# Patient Record
Sex: FEMALE | Race: BLACK OR AFRICAN AMERICAN | Employment: FULL TIME | URBAN - METROPOLITAN AREA
[De-identification: names, ages, dates, MRNs, and addresses within clinical notes are randomized per-mention and may not be internally consistent; named-entity substitution may affect disease eponyms.]

---

## 2017-03-09 ENCOUNTER — OFFICE VISIT (OUTPATIENT)
Dept: OBGYN CLINIC | Age: 32
End: 2017-03-09

## 2017-03-09 VITALS
DIASTOLIC BLOOD PRESSURE: 84 MMHG | HEIGHT: 66 IN | WEIGHT: 141 LBS | SYSTOLIC BLOOD PRESSURE: 120 MMHG | BODY MASS INDEX: 22.66 KG/M2

## 2017-03-09 DIAGNOSIS — N93.9 ABNORMAL UTERINE BLEEDING (AUB): Primary | ICD-10-CM

## 2017-03-09 LAB
HCG URINE, QL. (POC): NEGATIVE
VALID INTERNAL CONTROL?: YES

## 2017-03-09 RX ORDER — MIRTAZAPINE 15 MG/1
30 TABLET, FILM COATED ORAL
COMMUNITY

## 2017-03-09 NOTE — MR AVS SNAPSHOT
Visit Information Date & Time Provider Department Dept. Phone Encounter #  
 3/9/2017  2:00 PM Suzanne Turk MD [facility] [phone] [id number] Upcoming Health Maintenance Date Due  
 PAP AKA CERVICAL CYTOLOGY 3/21/2007 INFLUENZA AGE 9 TO ADULT 8/1/2016 Allergies as of 3/9/2017  Review Complete On: 3/9/2017 By: Alexis Hays No Known Allergies Current Immunizations  Never Reviewed No immunizations on file. Not reviewed this visit You Were Diagnosed With   
  
 Codes Comments Encounter for gynecological examination without abnormal finding    -  Primary ICD-10-CM: T32.745 ICD-9-CM: V72.31 Abnormal uterine bleeding (AUB)     ICD-10-CM: N93.9 ICD-9-CM: 626.9 Vitals BP Height(growth percentile) Weight(growth percentile) BMI OB Status Smoking Status 120/84 5' 5.5\" (1.664 m) 141 lb (64 kg) 23.11 kg/m2 Having regular periods Never Smoker BMI and BSA Data Body Mass Index Body Surface Area  
 23.11 kg/m 2 1.72 m 2 Your Updated Medication List  
  
   
This list is accurate as of: 3/9/17  2:37 PM.  Always use your most recent med list.  
  
  
  
  
 REMERON 15 mg tablet Generic drug:  mirtazapine Take  by mouth nightly. We Performed the Following AMB POC URINE PREGNANCY TEST, VISUAL COLOR COMPARISON [67172 CPT(R)] CBC W/O DIFF [72558 CPT(R)] TSH AND FREE T4 [80607 CPT(R)] Patient Instructions Shot for [employer] Control: Care Instructions Your Care Instructions The shot is used to prevent pregnancy. You get the shot in your upper arm or rear end (buttocks). The shot gives you a dose of the hormone progestin. The shot is often called by its brand name, Depo-Provera. The shot provides birth control for 3 months at a time. You then need another shot. Follow-up care is a key part of your treatment and safety.  Be sure to make and go to all appointments, and call your doctor if you are having problems. It's also a good idea to know your test results and keep a list of the medicines you take. How can you care for yourself at home? How do you use the birth control shot? · If you get the shot during the first 5 days of your normal period, use backup birth control, such as a condom, or don't have intercourse for 24 hours. · If you get the shot more than 5 days after your periods starts, use backup birth control or don't have intercourse for 5 days. · Talk to your doctor about a schedule to get the shot. You need the shot every 3 months. If you are late getting it, you'll need backup birth control. What if you miss or are late for a shot? Always read the label for specific instructions, or call your doctor. Here are some basic guidelines: · Use backup birth control, such as a condom, or don't have intercourse. Continue using one of these methods until 5 days after you get the missed or late shot. · If you had intercourse, you can use emergency contraception, such as the morning-after pill (Plan B). You can use emergency contraception for up to 5 days after having had sex, but it works best if you take it right away. What else do you need to know? · The shot has side effects. Because the shot protects for 3 months, the side effects may last 3 months. ¨ You may have changes in your period and your period may stop. You may also have spotting or bleeding between periods. ¨ You may have mood changes, less interest in sex, or weight gain. · The shot may cause bone loss. Talk to your doctor about this and take steps to prevent bone loss, such as getting enough calcium in your diet and exercising regularly. · Check with your doctor before you use any other medicines, including over-the-counter medicines, vitamins, herbal products, and supplements.  Birth control hormones may not work as well to prevent pregnancy when combined with other medicines. · The shot doesn't protect against sexually transmitted infections (STIs), such as herpes or HIV/AIDS. If you're not sure whether your sex partner might have an STI, use a condom to protect against infection. When should you call for help? Call your doctor now or seek immediate medical care if: 
· You have severe belly pain. Watch closely for changes in your health, and be sure to contact your doctor if: 
· You think you may be pregnant. · You have any problems with your birth control. · You feel you may be depressed. · You regularly have spotting. · You think you may have been exposed to or have a sexually transmitted infection. Where can you learn more? Go to http://ciaran-vish.info/. Enter G435 in the search box to learn more about \"Shot for Birth Control: Care Instructions. \" Current as of: May 30, 2016 Content Version: 11.1 © 9035-4251 [employer]. Care instructions adapted under license by [employer] (which disclaims liability or warranty for this information). If you have questions about a medical condition or this instruction, always ask your healthcare professional. [place] [address] any warranty or liability for your use of this information. Introducing [facility]! [facility] introduces ShoutWire patient portal. Now you can access parts of your medical record, email your doctor's office, and request medication refills online. 1. In your internet browser, go to [email or web address] 2. Click on the First Time User? Click Here link in the Sign In box. You will see the New Member Sign Up page. 3. Enter your ShoutWire Access Code exactly as it appears below. You will not need to use this code after youve completed the sign-up process. If you do not sign up before the expiration date, you must request a new code. · ShoutWire Access Code: [email or web address] Expires: 6/7/2017  2:27 PM 
 
4. Enter the last four digits of your Social Security Number (xxxx) and Date of Birth (mm/dd/yyyy) as indicated and click Submit. You will be taken to the next sign-up page. 5. Create a Your Style Unzipped ID. This will be your Your Style Unzipped login ID and cannot be changed, so think of one that is secure and easy to remember. 6. Create a Your Style Unzipped password. You can change your password at any time. 7. Enter your Password Reset Question and Answer. This can be used at a later time if you forget your password. 8. Enter your e-mail address. You will receive e-mail notification when new information is available in [address] 9. Click Sign Up. You can now view and download portions of your medical record. 10. Click the Download Summary menu link to download a portable copy of your medical information. If you have questions, please visit the Frequently Asked Questions section of the Your Style Unzipped website. Remember, Your Style Unzipped is NOT to be used for urgent needs. For medical emergencies, dial 911. Now available from your iPhone and Android! Please provide this summary of care documentation to your next provider. If you have any questions after today's visit, please call [phone].

## 2017-03-09 NOTE — PROGRESS NOTES
Abnormal bleeding note      Brendan Adkins is a [address] [place] y.o. female who complains of vaginal bleeding problems. Her current method of family planning is Depo-Provera injections. She developed this problem approximately 2 months ago. She has had vaginal bleeding which she describes as light, moderate lasting up to several weeks. Pad or tampon count: changes every 6 hours. Associated symptoms include recent depo injection    Alleviating factors: none    Aggravating factors: none     Her relevant past medical history:   Past Medical History:   Diagnosis Date    Depression         History reviewed. No pertinent surgical history. Social History     Occupational History    Not on file. Social History Main Topics    Smoking status: Never Smoker    Smokeless tobacco: Never Used    Alcohol use No    Drug use: No    Sexual activity: Not Currently     Partners: Male     Birth control/ protection: Injection     Family History   Problem Relation Age of Onset    Diabetes Mother     Hypertension Mother     Heart Disease Mother        No Known Allergies  Prior to Admission medications    Medication Sig Start Date End Date Taking? Authorizing Provider   mirtazapine (REMERON) 15 mg tablet Take  by mouth nightly.    Yes Historical Provider        Review of Systems - History obtained from the patient  Constitutional: negative for weight loss, fever, night sweats  HEENT: negative for hearing loss, earache, congestion, snoring, sorethroat  CV: negative for chest pain, palpitations, edema  Resp: negative for cough, shortness of breath, wheezing  Breast: negative for breast lumps, nipple discharge, galactorrhea  GI: negative for change in bowel habits, abdominal pain, black or bloody stools  : negative for frequency, dysuria, hematuria  MSK: negative for back pain, joint pain, muscle pain  Skin: negative for itching, rash, hives  Neuro: negative for dizziness, headache, confusion, weakness  Psych: negative for anxiety, depression, change in mood  Heme/lymph: negative for bleeding, bruising, pallor      Objective:    Visit Vitals    /84    Ht 5' 5.5\" (1.664 m)    Wt 141 lb (64 kg)    BMI 23.11 kg/m2          PHYSICAL EXAMINATION    Constitutional  · Appearance: well-nourished, well developed, alert, in no acute distress    HENT  · Head and Face: appears normal    Neck  · Inspection/Palpation: normal appearance, no masses or tenderness  · Lymph Nodes: no lymphadenopathy present  · Thyroid: gland size normal, nontender, no nodules or masses present on palpation    Gastrointestinal  · Abdominal Examination: abdomen non-tender to palpation, normal bowel sounds, no masses present  · Liver and spleen: no hepatomegaly present, spleen not palpable  · Hernias: no hernias identified    Genitourinary  · External Genitalia: normal appearance for age, no discharge present, no tenderness present, no inflammatory lesions present, no masses present, no atrophy present  · Vagina: normal vaginal vault without central or paravaginal defects, no discharge present, no inflammatory lesions present, no masses present  · Bladder: non-tender to palpation  · Urethra: appears normal  · Cervix: normal   · Uterus: normal size, shape and consistency  · Adnexa: no adnexal tenderness present, no adnexal masses present  · Perineum: perineum within normal limits, no evidence of trauma, no rashes or skin lesions present  · Anus: anus within normal limits, no hemorrhoids present  · Inguinal Lymph Nodes: no lymphadenopathy present    Skin  · General Inspection: no rash, no lesions identified    Neurologic/Psychiatric  · Mental Status:  · Orientation: grossly oriented to person, place and time  · Mood and Affect: mood normal, affect appropriate    Assessment/Plan:   AUB likely from depo, will start ocps and check tsh/cbc. Pt has ocps pack at home and will restart that pill and will call to notify what the name of the pill is.     Instructions given to pt. Handouts given to pt.

## 2017-03-09 NOTE — PATIENT INSTRUCTIONS
Shot for [employer] Control: Care Instructions  Your Care Instructions  The shot is used to prevent pregnancy. You get the shot in your upper arm or rear end (buttocks). The shot gives you a dose of the hormone progestin. The shot is often called by its brand name, Depo-Provera. The shot provides birth control for 3 months at a time. You then need another shot. Follow-up care is a key part of your treatment and safety. Be sure to make and go to all appointments, and call your doctor if you are having problems. It's also a good idea to know your test results and keep a list of the medicines you take. How can you care for yourself at home? How do you use the birth control shot? · If you get the shot during the first 5 days of your normal period, use backup birth control, such as a condom, or don't have intercourse for 24 hours. · If you get the shot more than 5 days after your periods starts, use backup birth control or don't have intercourse for 5 days. · Talk to your doctor about a schedule to get the shot. You need the shot every 3 months. If you are late getting it, you'll need backup birth control. What if you miss or are late for a shot? Always read the label for specific instructions, or call your doctor. Here are some basic guidelines:  · Use backup birth control, such as a condom, or don't have intercourse. Continue using one of these methods until 5 days after you get the missed or late shot. · If you had intercourse, you can use emergency contraception, such as the morning-after pill (Plan B). You can use emergency contraception for up to 5 days after having had sex, but it works best if you take it right away. What else do you need to know? · The shot has side effects. Because the shot protects for 3 months, the side effects may last 3 months. ¨ You may have changes in your period and your period may stop. You may also have spotting or bleeding between periods.   ¨ You may have mood changes, less interest in sex, or weight gain. · The shot may cause bone loss. Talk to your doctor about this and take steps to prevent bone loss, such as getting enough calcium in your diet and exercising regularly. · Check with your doctor before you use any other medicines, including over-the-counter medicines, vitamins, herbal products, and supplements. Birth control hormones may not work as well to prevent pregnancy when combined with other medicines. · The shot doesn't protect against sexually transmitted infections (STIs), such as herpes or HIV/AIDS. If you're not sure whether your sex partner might have an STI, use a condom to protect against infection. When should you call for help? Call your doctor now or seek immediate medical care if:  · You have severe belly pain. Watch closely for changes in your health, and be sure to contact your doctor if:  · You think you may be pregnant. · You have any problems with your birth control. · You feel you may be depressed. · You regularly have spotting. · You think you may have been exposed to or have a sexually transmitted infection. Where can you learn more? Go to http://ciaran-vish.info/. Enter J371 in the search box to learn more about \"Shot for Birth Control: Care Instructions. \"  Current as of: May 30, 2016  Content Version: 11.1  © 7795-7243 [employer]. Care instructions adapted under license by [employer] (which disclaims liability or warranty for this information). If you have questions about a medical condition or this instruction, always ask your healthcare professional. [place] [address] any warranty or liability for your use of this information.

## 2017-03-10 LAB
ERYTHROCYTE [DISTWIDTH] IN BLOOD BY AUTOMATED COUNT: 14.3 % (ref 12.3–15.4)
HCT VFR BLD AUTO: 33.8 % (ref 34–46.6)
HGB BLD-MCNC: 11.4 G/DL (ref 11.1–15.9)
MCH RBC QN AUTO: 29.5 PG (ref 26.6–33)
MCHC RBC AUTO-ENTMCNC: 33.7 G/DL (ref 31.5–35.7)
MCV RBC AUTO: 87 FL (ref 79–97)
PLATELET # BLD AUTO: 237 X10E3/UL (ref 150–379)
RBC # BLD AUTO: 3.87 X10E6/UL (ref 3.77–5.28)
T4 FREE SERPL-MCNC: 1.08 NG/DL (ref 0.82–1.77)
TSH SERPL DL<=0.005 MIU/L-ACNC: 0.84 UIU/ML (ref 0.45–4.5)
WBC # BLD AUTO: 4.7 X10E3/UL (ref 3.4–10.8)

## 2018-10-04 ENCOUNTER — OFFICE VISIT (OUTPATIENT)
Dept: OBGYN CLINIC | Age: 33
End: 2018-10-04

## 2018-10-04 VITALS
HEIGHT: 66 IN | SYSTOLIC BLOOD PRESSURE: 126 MMHG | WEIGHT: 156 LBS | HEART RATE: 105 BPM | DIASTOLIC BLOOD PRESSURE: 80 MMHG | BODY MASS INDEX: 25.07 KG/M2

## 2018-10-04 DIAGNOSIS — Z01.419 WELL WOMAN EXAM WITH ROUTINE GYNECOLOGICAL EXAM: Primary | ICD-10-CM

## 2018-10-04 DIAGNOSIS — N89.8 VAGINAL DISCHARGE: ICD-10-CM

## 2018-10-04 DIAGNOSIS — Z11.3 SCREEN FOR STD (SEXUALLY TRANSMITTED DISEASE): ICD-10-CM

## 2018-10-04 RX ORDER — QUETIAPINE FUMARATE 50 MG/1
50 TABLET, FILM COATED ORAL
COMMUNITY

## 2018-10-04 NOTE — PROGRESS NOTES
Annual exam ages [age] Dhavalcooper Mathews is a No obstetric history on file. ,  29 y.o. female [address] No LMP recorded (lmp unknown). Patient has had an injection. , who presents for her annual checkup. LV=3/2017 with Dr. Cecille Chen. 
 
 
- has been dx'd with anxiety and depression, does not usually take all of her prescribed meds 
- on Remiron and Seroquel \"for sleep\". Followed by psychiatrist. 
 
Penny Villanueva: 
She is having clear discharge with slight odor. Requests STD screen with swab. Partner has been unfaithful, was dx'd with STD, pt not sure what. States had blood work done through PCP office. With regard to the Gardasil vaccine, she is older than the FDA approved age to receive it. Menstrual status: 
 
Her periods are spotting in flow. She is using three to five pads or tampons per day, Was on DMPA. Had problems with AUB. Was due for last injection in August.  Last IC a couple of weeks ago. She denies dysmenorrhea. She denies premenstrual symptoms. Contraception: The current method of family planning is none. Sexual history: She reports using the following method of birth control/protection: Condoms. Last IC a few wks ago. Pap and Mammogram History: 
 
Her most recent Pap smear was normal, HPV was unknown, done at the beginning of this year? She does not have a history of abnormal paps. The patient has never had a mammogram. 
 
Breast Cancer History/Substance Abuse: 
 
Past Medical History:  
Diagnosis Date  Anxiety  Depression History reviewed. No pertinent surgical history. OB History  Para Term  AB Living 1 1  1  1 SAB TAB Ectopic Molar Multiple Live Births 1 # Outcome Date GA Lbr Berry/2nd Weight Sex Delivery Anes PTL Lv  
1  12/10/99 32w0d   M Vag-Spont  Y MACKENZIE Current Outpatient Prescriptions Medication Sig Dispense Refill  mirtazapine (REMERON) 15 mg tablet Take  by mouth nightly. Allergies: Review of patient's allergies indicates no known allergies. Social History Social History  Marital status:  Spouse name: N/A  
 Number of children: N/A  
 Years of education: N/A Occupational History  Not on file. Social History Main Topics  Smoking status: Never Smoker  Smokeless tobacco: Never Used  Alcohol use No  
 Drug use: No  
 Sexual activity: Not Currently Partners: Male Birth control/ protection: None Other Topics Concern  Not on file Social History Narrative ** Merged History Encounter ** Tobacco History:  reports that she has never smoked. She has never used smokeless tobacco. 
Alcohol Abuse:  reports that she does not drink alcohol. Drug Abuse:  reports that she does not use illicit drugs. There is no problem list on file for this patient. Family History Problem Relation Age of Onset  Diabetes Mother  Hypertension Mother  Heart Disease Mother  Prostate Cancer Father Review of Systems - History obtained from the patient Constitutional: negative for weight loss, fever, night sweats HEENT: negative for hearing loss, earache, congestion, snoring, sorethroat CV: negative for chest pain, palpitations, edema Resp: negative for cough, shortness of breath, wheezing GI: negative for change in bowel habits, abdominal pain, black or bloody stools : negative for frequency, dysuria, hematuria, vaginal discharge MSK: negative for back pain, joint pain, muscle pain Breast: negative for breast lumps, nipple discharge, galactorrhea Skin :negative for itching, rash, hives Neuro: negative for dizziness, headache, confusion, weakness Psych: negative for anxiety, depression, change in mood Heme/lymph: negative for bleeding, bruising, pallor Physical Exam 
 
Visit Vitals  /80  Pulse (!) 105  Ht 5' 5.5\" (1.664 m)  Wt 156 lb (70.8 kg)  LMP  (LMP Unknown)  BMI 25.56 kg/m2 Constitutional 
· Appearance: well-nourished, well developed, alert, in no acute distress HENT 
· Head and Face: appears normal with facial piercings Neck · Inspection/Palpation: normal appearance, no masses or tenderness · Lymph Nodes: no lymphadenopathy present · Thyroid: gland size normal, nontender, no nodules or masses present on palpation Chest 
· Respiratory Effort: breathing unlabored · Auscultation: normal breath sounds Cardiovascular · Heart: 
· Auscultation: regular rate and rhythm without murmur Breasts · Inspection of Breasts: breasts symmetrical, no skin changes, no discharge present, nipple appearance normal, no skin retraction present; nipples pierced bilaterally · Palpation of Breasts and Axillae: no masses present on palpation, no breast tenderness · Axillary Lymph Nodes: no lymphadenopathy present Gastrointestinal 
· Abdominal Examination: abdomen non-tender to palpation, normal bowel sounds, no masses present · Liver and spleen: no hepatomegaly present, spleen not palpable · Hernias: no hernias identified Genitourinary · External Genitalia: normal appearance for age, no discharge present, no tenderness present, no inflammatory lesions present, no masses present, no atrophy present · Vagina: normal vaginal vault without central or paravaginal defects, mod/lg amount pinkish-tan foamy discharge present, no inflammatory lesions present, no masses present · Bladder: non-tender to palpation · Urethra: appears normal 
· Cervix: normal  
· Uterus: normal size, shape and consistency · Adnexa: no adnexal tenderness present, no adnexal masses present · Perineum: perineum within normal limits, no evidence of trauma, no rashes or skin lesions present · Anus: anus within normal limits, no hemorrhoids present · Inguinal Lymph Nodes: no lymphadenopathy present Skin · General Inspection: no rash, no lesions identified Neurologic/Psychiatric · Mental Status: · Orientation: grossly oriented to person, place and time · Mood and Affect: mood normal, affect appropriate Assessment & Plan: · Routine gynecologic examination. Pap/HPV done. · Requests STD screen swab. NuSwab GCT. · Vaginal discharge, odor -> add BV/yeast, Nuswab Plus · Counseled re: diet, exercise, healthy lifestyle · Return for yearly wellness visits · Pt counseled regarding co-testing for high risk HPV with pap · Patient verbalized understanding Orders Placed This Encounter  NUSWAB VAGINITIS PLUS  
 PAP IG, HPV AND RFX HPV 42/60,87(033527) Order Specific Question:   Pap Source? Answer:   Cervical  
  Order Specific Question:   Total Hysterectomy? Answer:   No  
  Order Specific Question:   Supracervical Hysterectomy? Answer:   No  
  Order Specific Question:   Post Menopausal?  
  Answer:   No  
  Order Specific Question:   Hormone Therapy? Answer:   No  
  Order Specific Question:   IUD? Answer:   No  
  Order Specific Question:   Abnormal Bleeding? Answer:   No  
  Order Specific Question:   Pregnant Answer:   No  
  Order Specific Question:   Post Partum? Answer:    No

## 2018-10-04 NOTE — PATIENT INSTRUCTIONS

## 2018-10-04 NOTE — MR AVS SNAPSHOT
[address] [facility] [address] [facility] 
[phone] Patient: Tova Ochoa MRN: [id number] AEP:[birth year] Visit Information Date & Time Provider Department Dept. Phone Encounter #  
 10/4/2018  3:10  S Jo-Ann Boykin, Artie Rm [place] [phone] [id number] Upcoming Health Maintenance Date Due  
 PAP AKA CERVICAL CYTOLOGY 3/21/2007 Influenza Age 5 to Adult 8/1/2018 Allergies as of 10/4/2018  Review Complete On: 10/4/2018 By: Gilbert Barraza LPN No Known Allergies Current Immunizations  Never Reviewed No immunizations on file. Not reviewed this visit You Were Diagnosed With   
  
 Codes Comments Well woman exam with routine gynecological exam    -  Primary ICD-10-CM: B73.236 ICD-9-CM: V72.31 Vaginal discharge     ICD-10-CM: N89.8 ICD-9-CM: 623.5 Vitals BP Pulse Height(growth percentile) Weight(growth percentile) LMP BMI  
 126/80 (!) 105 5' 5.5\" (1.664 m) 156 lb (70.8 kg) (LMP Unknown) 25.56 kg/m2 OB Status Smoking Status Injection Never Smoker BMI and BSA Data Body Mass Index Body Surface Area 25.56 kg/m 2 1.81 m 2 Your Updated Medication List  
  
   
This list is accurate as of 10/4/18  3:35 PM.  Always use your most recent med list.  
  
  
  
  
 REMERON 15 mg tablet Generic drug:  mirtazapine Take  by mouth nightly. We Performed the Following [address] [id number] Custom] PAP IG, HPV AND RFX HPV 21/53,22(011652) [[id number] Custom] Patient Instructions Well Visit, Ages 25 to 48: Care Instructions Your Care Instructions Physical exams can help you stay healthy. Your doctor has checked your overall health and may have suggested ways to take good care of yourself. He or she also may have recommended tests.  At home, you can help prevent illness with healthy eating, regular exercise, and other steps. Follow-up care is a key part of your treatment and safety. Be sure to make and go to all appointments, and call your doctor if you are having problems. It's also a good idea to know your test results and keep a list of the medicines you take. How can you care for yourself at home? · Reach and stay at a healthy weight. This will lower your risk for many problems, such as obesity, diabetes, heart disease, and high blood pressure. · Get at least 30 minutes of physical activity on most days of the week. Walking is a good choice. You also may want to do other activities, such as running, swimming, cycling, or playing tennis or team sports. Discuss any changes in your exercise program with your doctor. · Do not smoke or allow others to smoke around you. If you need help quitting, talk to your doctor about stop-smoking programs and medicines. These can increase your chances of quitting for good. · Talk to your doctor about whether you have any risk factors for sexually transmitted infections (STIs). Having one sex partner (who does not have STIs and does not have sex with anyone else) is a good way to avoid these infections. · Use birth control if you do not want to have children at this time. Talk with your doctor about the choices available and what might be best for you. · Protect your skin from too much sun. When you're outdoors from 10 a.m. to 4 p.m., stay in the shade or cover up with clothing and a hat with a wide brim. Wear sunglasses that block UV rays. Even when it's cloudy, put broad-spectrum sunscreen (SPF 30 or higher) on any exposed skin. · See a dentist one or two times a year for checkups and to have your teeth cleaned. · Wear a seat belt in the car. · Drink alcohol in moderation, if at all. That means no more than 2 drinks a day for men and 1 drink a day for women. Follow your doctor's advice about when to have certain tests. These tests can spot problems early. For everyone · Cholesterol. Have the fat (cholesterol) in your blood tested after age 21. Your doctor will tell you how often to have this done based on your age, family history, or other things that can increase your risk for heart disease. · Blood pressure. Have your blood pressure checked during a routine doctor visit. Your doctor will tell you how often to check your blood pressure based on your age, your blood pressure results, and other factors. · Vision. Talk with your doctor about how often to have a glaucoma test. 
· Diabetes. Ask your doctor whether you should have tests for diabetes. · Colon cancer. Have a test for colon cancer at age 48. You may have one of several tests. If you are younger than 48, you may need a test earlier if you have any risk factors. Risk factors include whether you already had a precancerous polyp removed from your colon or whether your parent, brother, sister, or child has had colon cancer. For women · Breast exam and mammogram. Talk to your doctor about when you should have a clinical breast exam and a mammogram. Medical experts differ on whether and how often women under 50 should have these tests. Your doctor can help you decide what is right for you. · Pap test and pelvic exam. Begin Pap tests at age 24. A Pap test is the best way to find cervical cancer. The test often is part of a pelvic exam. Ask how often to have this test. 
· Tests for sexually transmitted infections (STIs). Ask whether you should have tests for STIs. You may be at risk if you have sex with more than one person, especially if your partners do not wear condoms. For men · Tests for sexually transmitted infections (STIs). Ask whether you should have tests for STIs. You may be at risk if you have sex with more than one person, especially if you do not wear a condom. · Testicular cancer exam. Ask your doctor whether you should check your testicles regularly. · Prostate exam. Talk to your doctor about whether you should have a blood test (called a PSA test) for prostate cancer. Experts differ on whether and when men should have this test. Some experts suggest it if you are older than 39 and are -American or have a father or brother who got prostate cancer when he was younger than 72. When should you call for help? Watch closely for changes in your health, and be sure to contact your doctor if you have any problems or symptoms that concern you. Where can you learn more? Go to http://ciaran-vish.info/. Enter P072 in the search box to learn more about \"Well Visit, Ages 25 to 48: Care Instructions. \" Current as of: March 29, 2018 Content Version: 11.8 © 7171-4616 Healthwise, Incorporated. Care instructions adapted under license by [employer] (which disclaims liability or warranty for this information). If you have questions about a medical condition or this instruction, always ask your healthcare professional. [place] [address] any warranty or liability for your use of this information. Introducing [facility]! [employer] introduces [employer] patient portal. Now you can access parts of your medical record, email your doctor's office, and request medication refills online. 1. In your internet browser, go to [email or web address] 2. Click on the First Time User? Click Here link in the Sign In box. You will see the New Member Sign Up page. 3. Enter your [employer] Access Code exactly as it appears below. You will not need to use this code after youve completed the sign-up process. If you do not sign up before the expiration date, you must request a new code. · [employer] Access Code: [email or web address] Expires: 1/2/2019  3:35 PM 
 
 4. Enter the last four digits of your Social Security Number (xxxx) and Date of Birth (mm/dd/yyyy) as indicated and click Submit. You will be taken to the next sign-up page. 5. Create a [employer] ID. This will be your [employer] login ID and cannot be changed, so think of one that is secure and easy to remember. 6. Create a [employer] password. You can change your password at any time. 7. Enter your Password Reset Question and Answer. This can be used at a later time if you forget your password. 8. Enter your e-mail address. You will receive e-mail notification when new information is available in [address] 9. Click Sign Up. You can now view and download portions of your medical record. 10. Click the Download Summary menu link to download a portable copy of your medical information. If you have questions, please visit the Frequently Asked Questions section of the [employer] website. Remember, [employer] is NOT to be used for urgent needs. For medical emergencies, dial 911. Now available from your iPhone and Android! Please provide this summary of care documentation to your next provider. Your primary care clinician is listed as Phys Other. If you have any questions after today's visit, please call [phone].

## 2018-10-06 LAB
CYTOLOGIST CVX/VAG CYTO: NORMAL
CYTOLOGY CVX/VAG DOC THIN PREP: NORMAL
DX ICD CODE: NORMAL
HPV I/H RISK 1 DNA CVX QL PROBE+SIG AMP: NEGATIVE
Lab: NORMAL
OTHER STN SPEC: NORMAL
PATH REPORT.FINAL DX SPEC: NORMAL
STAT OF ADQ CVX/VAG CYTO-IMP: NORMAL

## 2018-10-10 LAB
A VAGINAE DNA VAG QL NAA+PROBE: ABNORMAL SCORE
BVAB2 DNA VAG QL NAA+PROBE: ABNORMAL SCORE
C ALBICANS DNA VAG QL NAA+PROBE: POSITIVE
C GLABRATA DNA VAG QL NAA+PROBE: NEGATIVE
C TRACH RRNA SPEC QL NAA+PROBE: NEGATIVE
MEGA1 DNA VAG QL NAA+PROBE: ABNORMAL SCORE
N GONORRHOEA RRNA SPEC QL NAA+PROBE: NEGATIVE
T VAGINALIS RRNA SPEC QL NAA+PROBE: POSITIVE

## 2018-10-10 RX ORDER — METRONIDAZOLE 500 MG/1
500 TABLET ORAL 2 TIMES DAILY
Qty: 14 TAB | Refills: 0 | Status: SHIPPED | OUTPATIENT
Start: 2018-10-10 | End: 2018-10-17

## 2018-10-10 RX ORDER — FLUCONAZOLE 150 MG/1
150 TABLET ORAL DAILY
Qty: 1 TAB | Refills: 0 | Status: SHIPPED | OUTPATIENT
Start: 2018-10-10 | End: 2018-10-11

## 2019-05-03 ENCOUNTER — HOSPITAL ENCOUNTER (OUTPATIENT)
Dept: MRI IMAGING | Age: 34
Discharge: HOME | End: 2019-05-03
Payer: COMMERCIAL

## 2019-05-03 DIAGNOSIS — S46.911D STRAIN OF RIGHT SHOULDER, SUBSEQUENT ENCOUNTER: ICD-10-CM

## 2019-05-03 PROCEDURE — 73221 MRI JOINT UPR EXTREM W/O DYE: CPT

## 2020-10-20 ENCOUNTER — HOSPITAL ENCOUNTER (EMERGENCY)
Age: 35
Discharge: HOME | End: 2020-10-20
Attending: EMERGENCY MEDICINE
Payer: COMMERCIAL

## 2020-10-20 VITALS
OXYGEN SATURATION: 100 % | SYSTOLIC BLOOD PRESSURE: 131 MMHG | HEART RATE: 109 BPM | TEMPERATURE: 98.6 F | DIASTOLIC BLOOD PRESSURE: 88 MMHG | RESPIRATION RATE: 16 BRPM | BODY MASS INDEX: 25.51 KG/M2 | HEIGHT: 66 IN | WEIGHT: 158.73 LBS

## 2020-10-20 DIAGNOSIS — R51.9 NONINTRACTABLE HEADACHE, UNSPECIFIED CHRONICITY PATTERN, UNSPECIFIED HEADACHE TYPE: Primary | ICD-10-CM

## 2020-10-20 DIAGNOSIS — I10 ASYMPTOMATIC HYPERTENSION: ICD-10-CM

## 2020-10-20 LAB
ATRIAL RATE: 96 BPM
CALCULATED P AXIS, ECG09: 56 DEGREES
CALCULATED R AXIS, ECG10: 51 DEGREES
CALCULATED T AXIS, ECG11: 32 DEGREES
DIAGNOSIS, 93000: NORMAL
P-R INTERVAL, ECG05: 136 MS
Q-T INTERVAL, ECG07: 334 MS
QRS DURATION, ECG06: 68 MS
QTC CALCULATION (BEZET), ECG08: 421 MS
TROPONIN I SERPL-MCNC: <0.05 NG/ML
VENTRICULAR RATE, ECG03: 96 BPM

## 2020-10-20 PROCEDURE — 74011250636 HC RX REV CODE- 250/636: Performed by: EMERGENCY MEDICINE

## 2020-10-20 PROCEDURE — 96374 THER/PROPH/DIAG INJ IV PUSH: CPT

## 2020-10-20 PROCEDURE — 84484 ASSAY OF TROPONIN QUANT: CPT

## 2020-10-20 PROCEDURE — 36415 COLL VENOUS BLD VENIPUNCTURE: CPT

## 2020-10-20 PROCEDURE — 74011250637 HC RX REV CODE- 250/637: Performed by: EMERGENCY MEDICINE

## 2020-10-20 PROCEDURE — 93005 ELECTROCARDIOGRAM TRACING: CPT

## 2020-10-20 PROCEDURE — 99284 EMERGENCY DEPT VISIT MOD MDM: CPT

## 2020-10-20 RX ORDER — HYDROCHLOROTHIAZIDE 25 MG/1
12.5 TABLET ORAL DAILY
Qty: 5 TAB | Refills: 0 | Status: SHIPPED | OUTPATIENT
Start: 2020-10-20 | End: 2020-10-30

## 2020-10-20 RX ORDER — HYDROCHLOROTHIAZIDE 25 MG/1
12.5 TABLET ORAL
Status: COMPLETED | OUTPATIENT
Start: 2020-10-20 | End: 2020-10-20

## 2020-10-20 RX ORDER — KETOROLAC TROMETHAMINE 30 MG/ML
30 INJECTION, SOLUTION INTRAMUSCULAR; INTRAVENOUS
Status: DISCONTINUED | OUTPATIENT
Start: 2020-10-20 | End: 2020-10-20

## 2020-10-20 RX ORDER — KETOROLAC TROMETHAMINE 30 MG/ML
30 INJECTION, SOLUTION INTRAMUSCULAR; INTRAVENOUS
Status: COMPLETED | OUTPATIENT
Start: 2020-10-20 | End: 2020-10-20

## 2020-10-20 RX ORDER — HYDROCHLOROTHIAZIDE 25 MG/1
12.5 TABLET ORAL DAILY
Status: DISCONTINUED | OUTPATIENT
Start: 2020-10-21 | End: 2020-10-20

## 2020-10-20 RX ADMIN — HYDROCHLOROTHIAZIDE 12.5 MG: 25 TABLET ORAL at 14:44

## 2020-10-20 RX ADMIN — KETOROLAC TROMETHAMINE 30 MG: 30 INJECTION, SOLUTION INTRAMUSCULAR at 14:45

## 2020-10-20 NOTE — DISCHARGE INSTRUCTIONS
You were seen in the ER for your symptoms. Please take ibuprofen or naproxen for your headache. Please follow-up with your primary care doctor. Please start taking the medicaion for blood pressure and follow-up with your PCP who may adjust this medication.

## 2020-10-20 NOTE — ED PROVIDER NOTES
EMERGENCY DEPARTMENT HISTORY AND PHYSICAL EXAM      Date: 10/20/2020  Patient Name: Ziggy Bruns    History of Presenting Illness     Chief Complaint   Patient presents with    Headache     H/A for 3 days at frontal area. BP elevated today. sent by PCP    Hypertension     /110 at [facility] this am.         History Provided By: Patient    HPI: Ziggy Burns, 30 y.o. female presents to the ED with cc of hypertension. Patient reports intermittent headache x1 week. Bitemporal, radiates to back, improves with going to sleep. Constant. Has taken excedrin with some improvement. Recently had hysterectomy. Not recently pregnant. Patient went to see OB and noted pressure was high on Wednesday, scheduled to see PCP and had blood work which was normal including renal function. Took blood pressure at [facility] and was high and advised to go to PCP who referred to ED. Has appointment Friday. No other medical history. Had hysterectomy 9/11. There are no other complaints, changes, or physical findings at this time. PCP: Other, MD Marissa    No current facility-administered medications on file prior to encounter. Current Outpatient Medications on File Prior to Encounter   Medication Sig Dispense Refill    QUEtiapine (SEROQUEL) 50 mg tablet Take 50 mg by mouth.  mirtazapine (REMERON) 15 mg tablet Take 30 mg by mouth nightly. Past History     Past Medical History:  Past Medical History:   Diagnosis Date    Anxiety     Depression     Routine Papanicolaou smear 10/04/2018    Negative ; HPV Negative        Past Surgical History:  No past surgical history on file.     Family History:  Family History   Problem Relation Age of Onset    Diabetes Mother     Hypertension Mother     Heart Disease Mother     Prostate Cancer Father        Social History:  Social History     Tobacco Use    Smoking status: Never Smoker    Smokeless tobacco: Never Used   Substance Use Topics    Alcohol use: No    Drug use: No       Allergies:  No Known Allergies      Review of Systems   Review of Systems   Constitutional: Negative for activity change, chills and fever. HENT: Negative for facial swelling and voice change. Eyes: Negative for redness. Respiratory: Negative for cough, shortness of breath and wheezing. Cardiovascular: Negative for chest pain and leg swelling. Gastrointestinal: Negative for abdominal pain, diarrhea, nausea and vomiting. Genitourinary: Negative for decreased urine volume. Musculoskeletal: Negative for gait problem. Skin: Negative for pallor and rash. Neurological: Positive for headaches. Negative for tremors and facial asymmetry. Psychiatric/Behavioral: Negative for agitation. All other systems reviewed and are negative. Physical Exam   Physical Exam  Vitals signs and nursing note reviewed. Constitutional:       Comments: 44-year-old female, resting in bed, no distress   HENT:      Head: Normocephalic and atraumatic. Eyes:      Funduscopic exam:     Right eye: No hemorrhage or papilledema. Left eye: No hemorrhage or papilledema. Neck:      Musculoskeletal: Normal range of motion. Cardiovascular:      Rate and Rhythm: Normal rate and regular rhythm. Heart sounds: No murmur. No friction rub. No gallop. Pulmonary:      Effort: Pulmonary effort is normal.      Breath sounds: Normal breath sounds. Abdominal:      Palpations: Abdomen is soft. Tenderness: There is no abdominal tenderness. Musculoskeletal: Normal range of motion. Skin:     General: Skin is warm. Capillary Refill: Capillary refill takes less than 2 seconds. Neurological:      General: No focal deficit present. Mental Status: She is alert and oriented to person, place, and time. Mental status is at baseline. Cranial Nerves: No cranial nerve deficit. Sensory: No sensory deficit. Motor: No weakness.       Coordination: Coordination normal.      Gait: Gait normal.   Psychiatric:         Mood and Affect: Mood normal.         Diagnostic Study Results     Labs -     Recent Results (from the past 12 hour(s))   TROPONIN I    Collection Time: 10/20/20  2:48 PM   Result Value Ref Range    Troponin-I, Qt. <0.05 <0.05 ng/mL   EKG, 12 LEAD, INITIAL    Collection Time: 10/20/20  2:52 PM   Result Value Ref Range    Ventricular Rate 96 BPM    Atrial Rate 96 BPM    P-R Interval 136 ms    QRS Duration 68 ms    Q-T Interval 334 ms    QTC Calculation (Bezet) 421 ms    Calculated P Axis 56 degrees    Calculated R Axis 51 degrees    Calculated T Axis 32 degrees    Diagnosis       Normal sinus rhythm  Normal ECG    Confirmed by Padilla Mauro ([id number]) on 10/20/2020 10:03:43 PM         Radiologic Studies -   No orders to display     CT Results  (Last 48 hours)    None        CXR Results  (Last 48 hours)    None          Medical Decision Making   I am the first provider for this patient. I reviewed the vital signs, available nursing notes, past medical history, past surgical history, family history and social history. Vital Signs-Reviewed the patient's vital signs. Patient Vitals for the past 12 hrs:   Temp Pulse Resp BP SpO2   10/20/20 1548    131/88    10/20/20 1444  (!) 109  134/67    10/20/20 1343 98.6 °F (37 °C) (!) 109 16 (!) 160/107 100 %     Records Reviewed: Nursing Notes and Old Medical Records    Provider Notes (Medical Decision Making):     27-year-old female with a past medical history of previous hysterectomy presents emergency department with a chief complaint of headache and elevated blood pressure. Regarding patient's headache, patient appears well, given duration of symptoms and clinical description, doubt subarachnoid hemorrhage. Doubt meningitis or encephalitis. Doubt hypertensive emergency. Suspect tension type headache given bitemporal nature. Certainly patient's pain could be contributing to her high blood pressure. Will trial Toradol.   She is neuro intact without papilledema. Regarding patient's high blood pressure, it appears has been a more chronic issue. Suspect asymptomatic hypertension. Patient has a follow-up with her PCP on Friday. She had labs checked which were reportedly normal including renal function. I will check an EKG and troponin. We will start patient on low-dose HCTZ, d/w patient her PCP may change    ED Course:   Initial assessment performed. The patients presenting problems have been discussed, and they are in agreement with the care plan formulated and outlined with them. I have encouraged them to ask questions as they arise throughout their visit. ED Course as of Oct 20 2345   Tue Oct 20, 2020   1501 Preliminary EKG interpreted by me. Shows sinus rhythm with a HR of 96. No ST elevations or depressions concerning for ischemia. Normal intervals. [MB]   1536 Troponin is negative. Will discharge with plan above. Recommend return precautions. [MB]   0350 Pain improved with toradol    [MB]      ED Course User Index  [MB] MD Juan Carlos Huertas MD      Disposition:    Reassessments as above. Labs and ED course reviewed. Patient was discharged home and was provided strict return precautions. Patient to follow-up with PCP or specialist per discharge instructions or return to ED for worsening symptoms. DISCHARGE PLAN:  1. Discharge Medication List as of 10/20/2020  3:38 PM        2. Follow-up Information     Follow up With Specialties Details Why Contact Info    [facility] EMERGENCY DEPT Emergency Medicine  If symptoms worsen [address]  [address] [facility]  [phone]    Your PCP  On 10/23/2020  As scheduled Friday        3. Return to ED if worse     Diagnosis     Clinical Impression:   1. Nonintractable headache, unspecified chronicity pattern, unspecified headache type    2.  Asymptomatic hypertension        Attestations:    Juan Carlos Mike MD    Please note that this dictation was completed with Dragon, the computer voice recognition software. Quite often unanticipated grammatical, syntax, homophones, and other interpretive errors are inadvertently transcribed by the computer software. Please disregard these errors. Please excuse any errors that have escaped final proofreading. Thank you.

## 2020-12-11 ENCOUNTER — VIRTUAL VISIT (OUTPATIENT)
Dept: NEUROLOGY | Age: 35
End: 2020-12-11

## 2020-12-11 RX ORDER — MIRTAZAPINE 30 MG/1
30 TABLET, FILM COATED ORAL AT BEDTIME
COMMUNITY
Start: 2020-02-19

## 2020-12-11 RX ORDER — METOPROLOL SUCCINATE 25 MG/1
25 TABLET, EXTENDED RELEASE ORAL DAILY
COMMUNITY
Start: 2020-12-09

## 2020-12-11 RX ORDER — BUPROPION HYDROCHLORIDE 100 MG/1
TABLET, EXTENDED RELEASE ORAL
COMMUNITY
Start: 2020-10-14

## 2020-12-11 RX ORDER — ESTRADIOL 2 MG/1
2 TABLET ORAL DAILY
COMMUNITY
Start: 2020-12-09

## 2020-12-11 NOTE — PATIENT INSTRUCTIONS
Office Policies 
 
o Phone calls/patient messages: Please allow up to 24 hours for someone in the office to contact you about your call or message. Be mindful your provider may be out of the office or your message may require further review. We encourage you to use Vinny for your messages as this is a faster, more efficient way to communicate with our office 
 
o Medication Refills: 
Prescription medications require up to 48 business hours to process. We encourage you to use Vinny for your refills. For controlled medications: Please allow up to 72 business hours to process. Certain medications may require you to  a written prescription at our office. NO narcotic/controlled medications will be prescribed after 4pm Monday through Friday or on weekends 
 
o Form/Paperwork Completion: We ask that you allow 7-14 business days. You may also download your forms to Vinny to have your doctor print off. Due to COVID-19, please note there may be a longer wait time than usual. Thank you

## 2021-07-27 ENCOUNTER — TRANSCRIBE ORDER (OUTPATIENT)
Dept: SCHEDULING | Age: 36
End: 2021-07-27

## 2021-07-27 DIAGNOSIS — S46.811A STRAIN OF OTHER MUSCLES, FASCIA AND TENDONS AT SHOULDER AND UPPER ARM LEVEL, RIGHT ARM, INITIAL ENCOUNTER: Primary | ICD-10-CM

## 2021-07-30 ENCOUNTER — HOSPITAL ENCOUNTER (OUTPATIENT)
Dept: MRI IMAGING | Age: 36
Discharge: HOME | End: 2021-07-30
Attending: EMERGENCY MEDICINE
Payer: COMMERCIAL

## 2021-07-30 DIAGNOSIS — S46.811A STRAIN OF OTHER MUSCLES, FASCIA AND TENDONS AT SHOULDER AND UPPER ARM LEVEL, RIGHT ARM, INITIAL ENCOUNTER: ICD-10-CM

## 2021-07-30 PROCEDURE — 73221 MRI JOINT UPR EXTREM W/O DYE: CPT
